# Patient Record
Sex: FEMALE | Race: ASIAN | NOT HISPANIC OR LATINO | Employment: OTHER | URBAN - METROPOLITAN AREA
[De-identification: names, ages, dates, MRNs, and addresses within clinical notes are randomized per-mention and may not be internally consistent; named-entity substitution may affect disease eponyms.]

---

## 2022-07-14 ENCOUNTER — TELEPHONE (OUTPATIENT)
Dept: FAMILY MEDICINE | Facility: CLINIC | Age: 47
End: 2022-07-14
Payer: OTHER GOVERNMENT

## 2022-07-18 ENCOUNTER — TELEPHONE (OUTPATIENT)
Dept: FAMILY MEDICINE | Facility: CLINIC | Age: 47
End: 2022-07-18
Payer: OTHER GOVERNMENT

## 2022-07-18 NOTE — TELEPHONE ENCOUNTER
Patient contacted and scheduled appointment for new patient. Patient confirmed appointment date and time./

## 2022-07-18 NOTE — TELEPHONE ENCOUNTER
----- Message from Monica Tan sent at 7/18/2022  4:28 PM CDT -----  Contact: Self  Pt called looking to set up new pt appt. 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     Phone: [phone]

## 2022-07-26 ENCOUNTER — OFFICE VISIT (OUTPATIENT)
Dept: FAMILY MEDICINE | Facility: CLINIC | Age: 47
End: 2022-07-26
Payer: OTHER GOVERNMENT

## 2022-07-26 ENCOUNTER — LAB VISIT (OUTPATIENT)
Dept: LAB | Facility: HOSPITAL | Age: 47
End: 2022-07-26
Attending: FAMILY MEDICINE
Payer: OTHER GOVERNMENT

## 2022-07-26 VITALS
OXYGEN SATURATION: 99 % | BODY MASS INDEX: 29.59 KG/M2 | DIASTOLIC BLOOD PRESSURE: 78 MMHG | SYSTOLIC BLOOD PRESSURE: 110 MMHG | HEIGHT: 62 IN | RESPIRATION RATE: 19 BRPM | HEART RATE: 53 BPM | WEIGHT: 160.81 LBS

## 2022-07-26 DIAGNOSIS — Z12.31 VISIT FOR SCREENING MAMMOGRAM: ICD-10-CM

## 2022-07-26 DIAGNOSIS — Z02.1 PHYSICAL EXAM, PRE-EMPLOYMENT: Primary | ICD-10-CM

## 2022-07-26 DIAGNOSIS — Z02.1 PHYSICAL EXAM, PRE-EMPLOYMENT: ICD-10-CM

## 2022-07-26 DIAGNOSIS — Z12.11 COLON CANCER SCREENING: ICD-10-CM

## 2022-07-26 LAB
ALBUMIN SERPL BCP-MCNC: 3.8 G/DL (ref 3.5–5.2)
ALP SERPL-CCNC: 61 U/L (ref 55–135)
ALT SERPL W/O P-5'-P-CCNC: 18 U/L (ref 10–44)
ANION GAP SERPL CALC-SCNC: 9 MMOL/L (ref 8–16)
AST SERPL-CCNC: 16 U/L (ref 10–40)
BASOPHILS # BLD AUTO: 0.02 K/UL (ref 0–0.2)
BASOPHILS NFR BLD: 0.3 % (ref 0–1.9)
BILIRUB SERPL-MCNC: 0.3 MG/DL (ref 0.1–1)
BUN SERPL-MCNC: 17 MG/DL (ref 6–20)
CALCIUM SERPL-MCNC: 9.1 MG/DL (ref 8.7–10.5)
CHLORIDE SERPL-SCNC: 109 MMOL/L (ref 95–110)
CHOLEST SERPL-MCNC: 216 MG/DL (ref 120–199)
CHOLEST/HDLC SERPL: 3.3 {RATIO} (ref 2–5)
CO2 SERPL-SCNC: 24 MMOL/L (ref 23–29)
CREAT SERPL-MCNC: 0.8 MG/DL (ref 0.5–1.4)
DIFFERENTIAL METHOD: ABNORMAL
EOSINOPHIL # BLD AUTO: 0.1 K/UL (ref 0–0.5)
EOSINOPHIL NFR BLD: 2.1 % (ref 0–8)
ERYTHROCYTE [DISTWIDTH] IN BLOOD BY AUTOMATED COUNT: 12.6 % (ref 11.5–14.5)
EST. GFR  (AFRICAN AMERICAN): >60 ML/MIN/1.73 M^2
EST. GFR  (NON AFRICAN AMERICAN): >60 ML/MIN/1.73 M^2
GLUCOSE SERPL-MCNC: 99 MG/DL (ref 70–110)
HCT VFR BLD AUTO: 39 % (ref 37–48.5)
HDLC SERPL-MCNC: 65 MG/DL (ref 40–75)
HDLC SERPL: 30.1 % (ref 20–50)
HGB BLD-MCNC: 13 G/DL (ref 12–16)
IMM GRANULOCYTES # BLD AUTO: 0.01 K/UL (ref 0–0.04)
IMM GRANULOCYTES NFR BLD AUTO: 0.2 % (ref 0–0.5)
LDH SERPL L TO P-CCNC: 170 U/L (ref 110–260)
LDLC SERPL CALC-MCNC: 128.2 MG/DL (ref 63–159)
LYMPHOCYTES # BLD AUTO: 3 K/UL (ref 1–4.8)
LYMPHOCYTES NFR BLD: 48.1 % (ref 18–48)
MCH RBC QN AUTO: 28.4 PG (ref 27–31)
MCHC RBC AUTO-ENTMCNC: 33.3 G/DL (ref 32–36)
MCV RBC AUTO: 85 FL (ref 82–98)
MONOCYTES # BLD AUTO: 0.4 K/UL (ref 0.3–1)
MONOCYTES NFR BLD: 5.9 % (ref 4–15)
NEUTROPHILS # BLD AUTO: 2.7 K/UL (ref 1.8–7.7)
NEUTROPHILS NFR BLD: 43.4 % (ref 38–73)
NONHDLC SERPL-MCNC: 151 MG/DL
NRBC BLD-RTO: 0 /100 WBC
PLATELET # BLD AUTO: 253 K/UL (ref 150–450)
PMV BLD AUTO: 10.3 FL (ref 9.2–12.9)
POTASSIUM SERPL-SCNC: 4 MMOL/L (ref 3.5–5.1)
PROT SERPL-MCNC: 7.1 G/DL (ref 6–8.4)
RBC # BLD AUTO: 4.57 M/UL (ref 4–5.4)
SODIUM SERPL-SCNC: 142 MMOL/L (ref 136–145)
TRIGL SERPL-MCNC: 114 MG/DL (ref 30–150)
WBC # BLD AUTO: 6.28 K/UL (ref 3.9–12.7)

## 2022-07-26 PROCEDURE — 99999 PR PBB SHADOW E&M-EST. PATIENT-LVL III: CPT | Mod: PBBFAC,,, | Performed by: FAMILY MEDICINE

## 2022-07-26 PROCEDURE — 99213 OFFICE O/P EST LOW 20 MIN: CPT | Mod: PBBFAC | Performed by: FAMILY MEDICINE

## 2022-07-26 PROCEDURE — 99999 PR PBB SHADOW E&M-EST. PATIENT-LVL III: ICD-10-PCS | Mod: PBBFAC,,, | Performed by: FAMILY MEDICINE

## 2022-07-26 PROCEDURE — 80053 COMPREHEN METABOLIC PANEL: CPT | Performed by: FAMILY MEDICINE

## 2022-07-26 PROCEDURE — 87389 HIV-1 AG W/HIV-1&-2 AB AG IA: CPT | Performed by: FAMILY MEDICINE

## 2022-07-26 PROCEDURE — 80061 LIPID PANEL: CPT | Performed by: FAMILY MEDICINE

## 2022-07-26 PROCEDURE — 36415 COLL VENOUS BLD VENIPUNCTURE: CPT | Performed by: FAMILY MEDICINE

## 2022-07-26 PROCEDURE — 99203 PR OFFICE/OUTPT VISIT, NEW, LEVL III, 30-44 MIN: ICD-10-PCS | Mod: S$PBB,,, | Performed by: FAMILY MEDICINE

## 2022-07-26 PROCEDURE — 99203 OFFICE O/P NEW LOW 30 MIN: CPT | Mod: S$PBB,,, | Performed by: FAMILY MEDICINE

## 2022-07-26 PROCEDURE — 85025 COMPLETE CBC W/AUTO DIFF WBC: CPT | Performed by: FAMILY MEDICINE

## 2022-07-26 PROCEDURE — 83615 LACTATE (LD) (LDH) ENZYME: CPT | Performed by: FAMILY MEDICINE

## 2022-07-26 NOTE — PROGRESS NOTES
Subjective:       Patient ID: Debora Vidal is a 47 y.o. female.    Chief Complaint: Establish Care    HPI  47 year old female comes in to establish care. She has moved here recently and needs a referral for counseling. She also notes that she needs a 'well woman exam.' She says she had a mammo and pap smear about 3 years ago. She has had a colonoscopy recently with a polyp removed. She was told she needs to f/u in 5 years.    PMH, PSH, ALLERGIES, SH, FH reviewed in nurse's notes above  Medications reconciled in the nurse's notes      Review of Systems   Constitutional: Negative for chills and fever.   HENT: Negative for congestion, ear pain, postnasal drip, rhinorrhea, sore throat and trouble swallowing.    Eyes: Negative for redness and itching.   Respiratory: Negative for cough, shortness of breath and wheezing.    Cardiovascular: Negative for chest pain and palpitations.   Gastrointestinal: Negative for abdominal pain, diarrhea, nausea and vomiting.   Genitourinary: Negative for dysuria and frequency.   Skin: Negative for rash.   Neurological: Negative for weakness and headaches.   Psychiatric/Behavioral: Negative for dysphoric mood.       Objective:      Physical Exam  Vitals and nursing note reviewed.   Constitutional:       General: She is not in acute distress.     Appearance: She is well-developed.   HENT:      Head: Normocephalic and atraumatic.   Eyes:      Conjunctiva/sclera: Conjunctivae normal.      Pupils: Pupils are equal, round, and reactive to light.   Neck:      Thyroid: No thyromegaly.   Cardiovascular:      Rate and Rhythm: Normal rate and regular rhythm.      Heart sounds: Normal heart sounds.   Pulmonary:      Effort: Pulmonary effort is normal. No respiratory distress.      Breath sounds: Normal breath sounds. No wheezing.   Abdominal:      General: Bowel sounds are normal.      Palpations: Abdomen is soft.      Tenderness: There is no abdominal tenderness.   Musculoskeletal:         General:  Normal range of motion.      Cervical back: Normal range of motion and neck supple.   Lymphadenopathy:      Cervical: No cervical adenopathy.   Skin:     General: Skin is warm and dry.      Findings: No rash.   Neurological:      Mental Status: She is alert and oriented to person, place, and time.   Psychiatric:         Behavior: Behavior normal.          Assessment/Plan:       Problem List Items Addressed This Visit    None     Visit Diagnoses     Physical exam, pre-employment    -  Primary    Relevant Orders    Lipid Panel (Completed)    Comprehensive Metabolic Panel (Completed)    CBC Auto Differential (Completed)    Lactate Dehydrogenase (Completed)    HIV 1/2 Ag/Ab (4th Gen) (Completed)    Visit for screening mammogram        Relevant Orders    Mammo Digital Screening Bilat w/ [facility]    Colon cancer screening          had colonoscopy. Will get path.    Need to obtain recent pap/path.    RTC if condition acutely worsens or any other concerns, otherwise RTC as scheduled

## 2022-07-28 LAB — HIV 1+2 AB+HIV1 P24 AG SERPL QL IA: NEGATIVE

## 2022-08-09 ENCOUNTER — PATIENT OUTREACH (OUTPATIENT)
Dept: ADMINISTRATIVE | Facility: HOSPITAL | Age: 47
End: 2022-08-09
Payer: OTHER GOVERNMENT

## 2022-08-09 NOTE — PROGRESS NOTES
Chart reviewed, no Links immunization record noted.  No new results noted on [facility] or [facility] web site.  Care Everywhere updated.   Patient care coordination note updated.  LOV with PCP 7/26/2022.  Patient has scheduled MMG for 8/15/2022.  (Had colonoscopy in [place] - 2 polyps needs repeat in 5 years.    )    Left detailed message for patient to return call to discuss specific provider or facility testing completed at in [place].

## 2022-08-15 ENCOUNTER — HOSPITAL ENCOUNTER (OUTPATIENT)
Dept: RADIOLOGY | Facility: HOSPITAL | Age: 47
Discharge: HOME | End: 2022-08-15
Attending: FAMILY MEDICINE
Payer: OTHER GOVERNMENT

## 2022-08-15 VITALS — WEIGHT: 150 LBS | BODY MASS INDEX: 27.6 KG/M2 | HEIGHT: 62 IN

## 2022-08-15 DIAGNOSIS — Z12.31 VISIT FOR SCREENING MAMMOGRAM: ICD-10-CM

## 2022-08-15 PROCEDURE — 77063 BREAST TOMOSYNTHESIS BI: CPT | Mod: TC

## 2022-08-15 PROCEDURE — 77067 SCR MAMMO BI INCL CAD: CPT | Mod: TC

## 2022-09-29 ENCOUNTER — OFFICE VISIT (OUTPATIENT)
Dept: FAMILY MEDICINE | Facility: CLINIC | Age: 47
End: 2022-09-29
Payer: OTHER GOVERNMENT

## 2022-09-29 VITALS
WEIGHT: 160.19 LBS | HEART RATE: 58 BPM | HEIGHT: 62 IN | RESPIRATION RATE: 16 BRPM | SYSTOLIC BLOOD PRESSURE: 108 MMHG | DIASTOLIC BLOOD PRESSURE: 62 MMHG | BODY MASS INDEX: 29.48 KG/M2

## 2022-09-29 DIAGNOSIS — M79.604 PAIN OF RIGHT LOWER EXTREMITY: ICD-10-CM

## 2022-09-29 DIAGNOSIS — M79.671 RIGHT FOOT PAIN: Primary | ICD-10-CM

## 2022-09-29 DIAGNOSIS — M77.9 TENDINITIS: ICD-10-CM

## 2022-09-29 DIAGNOSIS — Z98.890 S/P BUNIONECTOMY: ICD-10-CM

## 2022-09-29 PROCEDURE — 99213 OFFICE O/P EST LOW 20 MIN: CPT | Mod: PBBFAC | Performed by: FAMILY MEDICINE

## 2022-09-29 PROCEDURE — 99999 PR PBB SHADOW E&M-EST. PATIENT-LVL III: CPT | Mod: PBBFAC,,, | Performed by: FAMILY MEDICINE

## 2022-09-29 PROCEDURE — 99213 PR OFFICE/OUTPT VISIT, EST, LEVL III, 20-29 MIN: ICD-10-PCS | Mod: S$PBB,,, | Performed by: FAMILY MEDICINE

## 2022-09-29 PROCEDURE — 99999 PR PBB SHADOW E&M-EST. PATIENT-LVL III: ICD-10-PCS | Mod: PBBFAC,,, | Performed by: FAMILY MEDICINE

## 2022-09-29 PROCEDURE — 99213 OFFICE O/P EST LOW 20 MIN: CPT | Mod: S$PBB,,, | Performed by: FAMILY MEDICINE

## 2022-09-29 NOTE — PROGRESS NOTES
Subjective:       Patient ID: Debora Vidal is a 47 y.o. female.    Chief Complaint: Referral    HPI  47 year old female comes in for right foot PT. She had a bunionectomy in march and is looking to continue physical therapy.     PMH, PSH, ALLERGIES, SH, FH reviewed in nurse's notes above  Medications reconciled in the nurse's notes    Review of Systems   Constitutional:  Negative for activity change and unexpected weight change.   HENT:  Negative for hearing loss, rhinorrhea and trouble swallowing.    Eyes:  Negative for discharge and visual disturbance.   Respiratory:  Negative for chest tightness and wheezing.    Cardiovascular:  Negative for chest pain and palpitations.   Gastrointestinal:  Negative for blood in stool, constipation, diarrhea and vomiting.   Endocrine: Negative for polydipsia and polyuria.   Genitourinary:  Negative for difficulty urinating, dysuria, hematuria and menstrual problem.   Musculoskeletal:  Negative for arthralgias, joint swelling and neck pain.   Neurological:  Negative for weakness and headaches.   Psychiatric/Behavioral:  Negative for confusion and dysphoric mood.      Objective:      Physical Exam  Vitals and nursing note reviewed.   Constitutional:       General: She is not in acute distress.     Appearance: She is well-developed.   HENT:      Head: Normocephalic and atraumatic.   Eyes:      Conjunctiva/sclera: Conjunctivae normal.      Pupils: Pupils are equal, round, and reactive to light.   Neck:      Thyroid: No thyromegaly.   Cardiovascular:      Rate and Rhythm: Normal rate and regular rhythm.      Heart sounds: Normal heart sounds.   Pulmonary:      Effort: Pulmonary effort is normal. No respiratory distress.      Breath sounds: Normal breath sounds. No wheezing.   Abdominal:      General: Bowel sounds are normal.      Palpations: Abdomen is soft.      Tenderness: There is no abdominal tenderness.   Musculoskeletal:         General: Normal range of motion.      Cervical  back: Normal range of motion and neck supple.   Lymphadenopathy:      Cervical: No cervical adenopathy.   Skin:     General: Skin is warm and dry.      Findings: No rash.   Neurological:      Mental Status: She is alert and oriented to person, place, and time.   Psychiatric:         Behavior: Behavior normal.        Assessment/Plan:       Problem List Items Addressed This Visit    None  Visit Diagnoses       Right foot pain    -  Primary    Relevant Orders    Ambulatory referral/consult to Physical/Occupational Therapy    S/P bunionectomy        Pain of right lower extremity        Tendinitis        Relevant Orders    Ambulatory referral/consult to Physical/Occupational Therapy        RTC if condition acutely worsens or any other concerns, otherwise RTC as scheduled

## 2022-10-03 ENCOUNTER — PATIENT MESSAGE (OUTPATIENT)
Dept: ADMINISTRATIVE | Facility: HOSPITAL | Age: 47
End: 2022-10-03
Payer: OTHER GOVERNMENT

## 2022-10-20 ENCOUNTER — TELEPHONE (OUTPATIENT)
Dept: FAMILY MEDICINE | Facility: CLINIC | Age: 47
End: 2022-10-20
Payer: OTHER GOVERNMENT

## 2022-10-20 NOTE — TELEPHONE ENCOUNTER
----- Message from Monica Tan sent at 10/20/2022 10:53 AM CDT -----  Contact: self  Debora Vidal  MRN: [id number]  : 1975  PCP: Ashley Pelaez  Home Phone      [phone]  Work Phone      Not on file.  Mobile          [phone]      MESSAGE:  Pt called stating her referral for PT has  and is needing an extension to 16 appts. Referral was for 4 office visits. Needs to be extended.    Phone:  [phone]

## 2022-10-20 NOTE — TELEPHONE ENCOUNTER
Spoke with patient and informed that  referral has been placed for patient to receive more PT visit. Status is pending, but patient was informed.

## 2022-10-29 ENCOUNTER — PATIENT MESSAGE (OUTPATIENT)
Dept: ADMINISTRATIVE | Facility: HOSPITAL | Age: 47
End: 2022-10-29
Payer: OTHER GOVERNMENT

## 2022-11-01 ENCOUNTER — PATIENT OUTREACH (OUTPATIENT)
Dept: ADMINISTRATIVE | Facility: HOSPITAL | Age: 47
End: 2022-11-01
Payer: OTHER GOVERNMENT

## 2022-11-01 NOTE — PROGRESS NOTES
Chart reviewed, no Links immunization record noted.  No new results noted on [facility] or [facility] web site.  Care Everywhere updated.   Patient care coordination note updated.  LOV with PCP 09/29/2022.  Uploaded Colonoscopy completed on 4/28/2022 from [facility], updated in .  Patient requested to have pap smear scheduled through patient portal.   Scheduled appointment for patient to see Molly Hyman MD for 12/07/2022 at 12:00pm.  Patient notified through patient portal.

## 2023-01-11 ENCOUNTER — PATIENT MESSAGE (OUTPATIENT)
Dept: FAMILY MEDICINE | Facility: CLINIC | Age: 48
End: 2023-01-11
Payer: OTHER GOVERNMENT

## 2023-01-11 DIAGNOSIS — M79.671 RIGHT FOOT PAIN: ICD-10-CM

## 2023-01-11 DIAGNOSIS — M77.9 TENDINITIS: Primary | ICD-10-CM

## 2023-01-12 ENCOUNTER — TELEPHONE (OUTPATIENT)
Dept: FAMILY MEDICINE | Facility: CLINIC | Age: 48
End: 2023-01-12
Payer: OTHER GOVERNMENT

## 2023-01-12 ENCOUNTER — PATIENT MESSAGE (OUTPATIENT)
Dept: FAMILY MEDICINE | Facility: CLINIC | Age: 48
End: 2023-01-12
Payer: OTHER GOVERNMENT

## 2023-01-12 DIAGNOSIS — H91.90 DECREASED HEARING, UNSPECIFIED LATERALITY: Primary | ICD-10-CM

## 2023-01-12 NOTE — TELEPHONE ENCOUNTER
Patient states that she had recent audiogram test with [facility] and it showed  some level of declines with ranges.    Can referral to audiology be placed.   Referral pending

## 2023-01-31 ENCOUNTER — LAB VISIT (OUTPATIENT)
Dept: LAB | Facility: HOSPITAL | Age: 48
End: 2023-01-31
Attending: OBSTETRICS & GYNECOLOGY
Payer: OTHER GOVERNMENT

## 2023-01-31 ENCOUNTER — OFFICE VISIT (OUTPATIENT)
Dept: OBSTETRICS AND GYNECOLOGY | Facility: CLINIC | Age: 48
End: 2023-01-31
Payer: OTHER GOVERNMENT

## 2023-01-31 VITALS
BODY MASS INDEX: 31.17 KG/M2 | RESPIRATION RATE: 18 BRPM | OXYGEN SATURATION: 97 % | WEIGHT: 169.38 LBS | DIASTOLIC BLOOD PRESSURE: 82 MMHG | HEIGHT: 62 IN | SYSTOLIC BLOOD PRESSURE: 124 MMHG | HEART RATE: 63 BPM

## 2023-01-31 DIAGNOSIS — Z12.4 CERVICAL CANCER SCREENING: ICD-10-CM

## 2023-01-31 DIAGNOSIS — N91.2 ABSENT MENSES: ICD-10-CM

## 2023-01-31 DIAGNOSIS — Z01.419 WELL WOMAN EXAM WITH ROUTINE GYNECOLOGICAL EXAM: Primary | ICD-10-CM

## 2023-01-31 DIAGNOSIS — Z12.31 BREAST CANCER SCREENING BY MAMMOGRAM: ICD-10-CM

## 2023-01-31 PROCEDURE — 99386 PREV VISIT NEW AGE 40-64: CPT | Mod: S$PBB,,, | Performed by: OBSTETRICS & GYNECOLOGY

## 2023-01-31 PROCEDURE — 88175 CYTOPATH C/V AUTO FLUID REDO: CPT | Performed by: OBSTETRICS & GYNECOLOGY

## 2023-01-31 PROCEDURE — 99999 PR PBB SHADOW E&M-EST. PATIENT-LVL III: CPT | Mod: PBBFAC,,, | Performed by: OBSTETRICS & GYNECOLOGY

## 2023-01-31 PROCEDURE — 83001 ASSAY OF GONADOTROPIN (FSH): CPT | Performed by: OBSTETRICS & GYNECOLOGY

## 2023-01-31 PROCEDURE — 82670 ASSAY OF TOTAL ESTRADIOL: CPT | Performed by: OBSTETRICS & GYNECOLOGY

## 2023-01-31 PROCEDURE — 99213 OFFICE O/P EST LOW 20 MIN: CPT | Mod: PBBFAC | Performed by: OBSTETRICS & GYNECOLOGY

## 2023-01-31 PROCEDURE — 87624 HPV HI-RISK TYP POOLED RSLT: CPT | Performed by: OBSTETRICS & GYNECOLOGY

## 2023-01-31 PROCEDURE — 99999 PR PBB SHADOW E&M-EST. PATIENT-LVL III: ICD-10-PCS | Mod: PBBFAC,,, | Performed by: OBSTETRICS & GYNECOLOGY

## 2023-01-31 PROCEDURE — 99386 PR PREVENTIVE VISIT,NEW,40-64: ICD-10-PCS | Mod: S$PBB,,, | Performed by: OBSTETRICS & GYNECOLOGY

## 2023-01-31 PROCEDURE — 36415 COLL VENOUS BLD VENIPUNCTURE: CPT | Performed by: OBSTETRICS & GYNECOLOGY

## 2023-01-31 NOTE — PROGRESS NOTES
Subjective:    Patient ID: Debora Vidal is a 47 y.o. y.o. female.     Chief Complaint: Annual Well Woman Exam     History of Present Illness:  Debora presents today for Annual Well Woman exam. She describes her menses as  absent .She denies pelvic pain.  She denies breast tenderness, masses, nipple discharge. She denies difficulty with urination or bowel movements. She denies menopausal symptoms such as hotflashes, vaginal dryness, and night sweats. She denies bloating, early satiety, or weight changes. She is sexually active. Contraception is by no method.    The following portions of the patient's history were reviewed and updated as appropriate: allergies, current medications, past family history, past medical history, past social history, past surgical history and problem list.      Menstrual History:   No LMP recorded. (Menstrual status: Other)..     OB History          3    Para   3    Term   3            AB        Living             SAB        IAB        Ectopic        Multiple        Live Births                     The following portions of the patient's history were reviewed and updated as appropriate: allergies, current medications, past family history, past medical history, past social history, past surgical history, and problem list.        ROS:     Review of Systems   Constitutional:  Negative for activity change, appetite change, chills, diaphoresis, fatigue, fever and unexpected weight change.   HENT:  Negative for mouth sores and tinnitus.    Eyes:  Negative for discharge and visual disturbance.   Respiratory:  Negative for cough, shortness of breath and wheezing.    Cardiovascular:  Negative for chest pain, palpitations and leg swelling.   Gastrointestinal:  Negative for abdominal pain, bloating, blood in stool, constipation, diarrhea, nausea and vomiting.   Endocrine: Negative for diabetes, hair loss, hot flashes, hyperthyroidism and hypothyroidism.   Genitourinary:  Negative for  "dysuria, flank pain, frequency, genital sores, hematuria, urgency, vaginal bleeding, vaginal discharge, vaginal pain, postcoital bleeding and vaginal odor.   Musculoskeletal:  Negative for back pain, joint swelling and myalgias.   Integumentary:  Negative for rash, acne, breast mass, nipple discharge and breast skin changes.   Neurological:  Negative for seizures, syncope, numbness and headaches.   Hematological:  Negative for adenopathy. Does not bruise/bleed easily.   Psychiatric/Behavioral:  Negative for depression and sleep disturbance. The patient is not nervous/anxious.    Breast: Negative for mass, mastodynia, nipple discharge and skin changes    Objective:    Vital Signs:  Vitals:    01/31/23 0959   BP: 124/82   Pulse: 63   Resp: 18   SpO2: 97%   Weight: 76.8 kg (169 lb 6.4 oz)   Height: 5' 2" (1.575 m)         Physical Exam:  General:  alert, cooperative, appears stated age   Skin:  Skin color, texture, turgor normal. No rashes or lesions   HEENT:  conjunctivae/corneas clear. PERRL.   Neck: supple, trachea midline, no adenopathy or thyromegally   Respiratory:  clear to auscultation bilaterally   Heart:  regular rate and rhythm, S1, S2 normal, no murmur, click, rub or gallop   Breasts:  no discharge, erythema, or tenderness   Abdomen:  normal findings: bowel sounds normal, no masses palpable, no organomegaly and soft, non-tender   Pelvis: External genitalia: normal general appearance  Urinary system: urethral meatus normal, bladder nontender  Vaginal: normal mucosa without prolapse or lesions  Cervix: normal appearance  Uterus: normal size, shape, position  Adnexa: normal size, nontender bilaterally   Extremities: Normal ROM; no edema, no cyanosis   Neurologial: Normal strength and tone. No focal numbness or weakness. Reflexes 2+ and equal.   Psychiatric: normal mood, speech, dress, and thought processes         Assessment:       Healthy female exam.     1. Well woman exam with routine gynecological exam  "   2. Cervical cancer screening    3. Breast cancer screening by mammogram    4. Absent menses          Plan:       Thin prep Pap smear.  HPV cotesting   Colonoscopy done 4/2022; repeat in 5 years  MMG UTD  Labs  RTC In 1 year or prn    COUNSELING:  Debora was counseled on STD pevention, use and side-effects of various contraceptive measures, A.C.O.G. Pap guidelines and recommendations for yearly pelvic exams in addition to recommendations for monthly self breast exams; to see her PCP for other health maintenance.

## 2023-02-01 ENCOUNTER — PATIENT MESSAGE (OUTPATIENT)
Dept: FAMILY MEDICINE | Facility: CLINIC | Age: 48
End: 2023-02-01
Payer: OTHER GOVERNMENT

## 2023-02-01 LAB
ESTRADIOL SERPL-MCNC: <10 PG/ML
FSH SERPL-ACNC: 54.53 MIU/ML

## 2023-02-03 ENCOUNTER — TELEPHONE (OUTPATIENT)
Dept: FAMILY MEDICINE | Facility: CLINIC | Age: 48
End: 2023-02-03
Payer: OTHER GOVERNMENT

## 2023-02-03 DIAGNOSIS — S46.812S STRAIN OF LEFT TRAPEZIUS MUSCLE, SEQUELA: Primary | ICD-10-CM

## 2023-02-03 NOTE — TELEPHONE ENCOUNTER
----- Message from Monica Tan sent at 2023  3:17 PM CST -----  Contact: self  Debora Vidal  MRN: [id number]  : 1975  PCP: Ashley Pelaez  Home Phone      [phone]  Work Phone      Not on file.  Mobile          [phone]      MESSAGE:   Pt called asking if there was any way provider can order some additional PT sessions at [facility] in [place] for patient.     Phone:  [phone]

## 2023-02-03 NOTE — TELEPHONE ENCOUNTER
Pt spoke with SHAHEED Ramirez [place] [facility] [place]. Pt states she needs 15 session for only PT for R foot per . Okay to submit threw ?

## 2023-02-07 LAB
FINAL PATHOLOGIC DIAGNOSIS: NORMAL
Lab: NORMAL

## 2023-02-09 LAB
HPV HR 12 DNA SPEC QL NAA+PROBE: NEGATIVE
HPV16 AG SPEC QL: NEGATIVE
HPV18 DNA SPEC QL NAA+PROBE: NEGATIVE

## 2023-02-24 ENCOUNTER — CLINICAL SUPPORT (OUTPATIENT)
Dept: REHABILITATION | Facility: HOSPITAL | Age: 48
End: 2023-02-24
Attending: FAMILY MEDICINE
Payer: OTHER GOVERNMENT

## 2023-02-24 DIAGNOSIS — S46.812S STRAIN OF LEFT TRAPEZIUS MUSCLE, SEQUELA: ICD-10-CM

## 2023-02-24 NOTE — PROGRESS NOTES
"  [facility]   Physical Therapy Initial Evaluation     Date: 2023   Name: Debora Vidal  Clinic Number: [id number]    Therapy Diagnosis: No diagnosis found.  Physician: Ashley Pelaez MD    Physician Orders: PT Eval and Treat   Medical Diagnosis from Referral: S46.812S (ICD-10-CM) - Strain of left trapezius muscle, sequela   Evaluation Date: 2023  Authorization Period Expiration: 2024  Plan of Care Expiration: ***  Progress Note Due: ***  Visit # / Visits authorized: 1/ pending   FOTO: 1/TBD    Precautions: Standard     Time In: ***  Time Out: ***  Total Appointment Time (timed & untimed codes): *** minutes      SUBJECTIVE     Date of onset: ***    History of current condition - Lawn/David presents to physical therapy evaluation with complaints of ***  that started ***. Patient stated *** pain with intermittent *** primarily  with functional activities such as ***.  Patient reports a history of ***.  Patient states their goals are to decrease pain and improve mobility to get back to *** without discomfort.    Falls: ***    Imaging:  *** done on ***. See imaging for details.        Prior Therapy: ***  Social History: Lives with ***  Occupation: ***  Prior Level of Function: ***  Current Level of Function: ***    Pain:  Current {0-10:04116::"0"}/10, worst {0-10:67351::"0"}/10, best {0-10:40796::"0"}/10   Location:  left shoulder(s)  Description: {Pain Description:45964}  Aggravating Factors: {Causes; Pain:49987}  Easing Factors: {Pain (activities that relieve):50741}    Patients goals: ***     Medical History:   Past Medical History:   Diagnosis Date    Gestational diabetes     Last Pregnancy        Surgical History:   Debora Vidal  has a past surgical history that includes Bunionectomy (Right, 2022); Knee arthroscopy (Right, );  section; and  section with tubal ligation ().    Medications:   Debora has a current medication list which includes the " following prescription(s): ibuprofen.    Allergies:   Review of patient's allergies indicates:   Allergen Reactions    Grass pollen-layton grass standard Hives, Shortness Of Breath and Swelling    Insect venom Swelling     ANTS    Cat/feline products Hives          OBJECTIVE     Observation: ***    Posture: ***    Cervical Range of Motion:    Degrees(%) Pain   Flexion *** ***     Extension *** ***     Right Rotation *** ***     Left Rotation *** ***     Right Side Bending *** ***   Left Side Bending *** ***      Passive Range of Motion:   Shoulder Right Left   Flexion *** ***   Abduction *** ***   ER at 0 *** ***   ER at 90 *** ***   IR *** ***      Active Range of Motion:   Shoulder Right Left   Flexion *** ***   Abduction *** ***   ER at 0 *** ***   ER at 90 *** ***   IR *** ***     Strength:  Shoulder Right Left   Flexion {AMB PT VESTIBULAR STRENGTH:05283} {AMB PT VESTIBULAR STRENGTH:01040}   Abduction {AMB PT VESTIBULAR STRENGTH:23619} {AMB PT VESTIBULAR STRENGTH:25043}   ER {AMB PT VESTIBULAR STRENGTH:92006} {AMB PT VESTIBULAR STRENGTH:40858}   IR {AMB PT VESTIBULAR STRENGTH:52076} {AMB PT VESTIBULAR STRENGTH:33884}   Serratus Anterior {AMB PT VESTIBULAR STRENGTH:14912} {AMB PT VESTIBULAR STRENGTH:54115}   Middle Trap {AMB PT VESTIBULAR STRENGTH:74870} {AMB PT VESTIBULAR STRENGTH:05891}   Low Trap {AMB PT VESTIBULAR STRENGTH:53180} {AMB PT VESTIBULAR STRENGTH:19837}       Special Tests:  Distraction ***   Spurlings ***       Special Tests:   Right Left   Empty Can Test *** ***   Drop Arm test *** ***   Subscaputlaris Lift Off *** ***   O'madi's test *** ***   Stevankin's Kenndy *** ***   Neer's Test *** ***   Anterior Apprehension test *** ***   Sulcus Sign *** ***   Bicep Load *** ***   Crank test *** ***     Cervical joint mobility: ***      Thoracic mobility: ***    Shoulder Joint Mobility: ***    Palpation: ***    Sensation: ***    Flexibility: ***  Lat: R *** ; L ***   Pec Minor: R *** ; L ***        Limitation/Restriction for FOTO *** Survey    Therapist reviewed FOTO scores for Debora Vidal on 2/24/2023.   FOTO documents entered into [employer] - see Media section.    Limitation Score: ***%         TREATMENT     Total Treatment time (time-based codes) separate from Evaluation: 15 minutes      Shahrzad/David received the treatments listed below:      therapeutic exercises to develop strength, endurance, ROM, flexibility, and posture for 15 minutes including:  ***    manual therapy techniques: Joint mobilizations, Manual traction, Myofacial release, and Soft tissue Mobilization were applied to the: left trapezius for 0 minutes, including:  Not today    neuromuscular re-education activities to improve: Sense, Proprioception, and Posture for 0 minutes. The following activities were included:  Not today    therapeutic activities to improve functional performance for 0  minutes, including:  Not today      supervised modalities after being cleared for contradictions: IFC Electrical Stimulation:  Debora received IFC Electrical Stimulation for pain control applied to the left trapezius. Pt received stimulation at 0 intensity at a frequency of  for 0 minutes. Debora tolderated treatment well without any adverse effects.        PATIENT EDUCATION AND HOME EXERCISES     Education provided:   -Educated patient plan of care and home exercise program.  -Educated patient possible soreness or discomforts following initial evaluation due to pain provoking objective measures.  - ***    Written Home Exercises Provided: yes. Exercises were reviewed and Latonia was able to demonstrate them prior to the end of the session.  Latonia demonstrated good  understanding of the education provided. See EMR under Patient Instructions for exercises provided during therapy sessions.    ASSESSMENT     Debora is a 47 y.o. female referred to outpatient Physical Therapy with a medical diagnosis of Strain of left trapezius muscle, sequela . Patient  presents with limited ***, and increased pain with functional activities such as ***.  Patient presents with positive *** special test indicating possible ***.  Patient will benefit from skilled outpatient physical therapy services to address the above listed deficits to return to prior level of function.       Patient prognosis is Good.   Patient will benefit from skilled outpatient Physical Therapy to address the deficits stated above and in the chart below, provide patient /family education, and to maximize patientt's level of independence.     Plan of care discussed with patient: Yes  Patient's spiritual, cultural and educational needs considered and patient is agreeable to the plan of care and goals as stated below:     Anticipated Barriers for therapy: ***    Medical Necessity is demonstrated by the following  History  Co-morbidities and personal factors that may impact the plan of care Co-morbidities:   Gestational diabetes    Personal Factors:   no deficits     low   Examination  Body Structures and Functions, activity limitations and participation restrictions that may impact the plan of care Body Regions:   neck  upper extremities    Body Systems:    {Body Systems:58635}    Participation Restrictions:   ***    Activity limitations:   Learning and applying knowledge  {Learning and applying knowledge:37970}    General Tasks and Commands  {Gen tasks and commands:03273}    Communication  {Communication:26706}    Mobility  {Mobility:74160}    Self care  {Self Care:44232}    Domestic Life  {Domestic Life:70436}         {Desc; low/moderate/high:934159}   Clinical Presentation {Clinical Presentation :21364} {Desc; low/moderate/high:239763}   Decision Making/ Complexity Score: {Desc; low/moderate/high:652738}     GOALS: Short Term Goals:  *** weeks  1.Report decreased *** pain < / =  ***/10  to increase tolerance for ***  2. Increase PROM ***   3. Increased strength by 1/3 MMT grade in *** to increase tolerance for  "ADL and work activities.  4. Pt to tolerate HEP to improve ROM and independence with ADL's    Long Term Goals: *** weeks  1.Report decreased *** pain  < / =  *** /10  to increase tolerance for ***  2.Increase AROM to ***  3.Increase strength to >/= 4/5 in *** to increase tolerance for ADL and work activities.  4. Pt goal: ***  5. Pt will have improved gcode of CJ (20-40% limited) on FOTO shoulder in order to demonstrate true functional improvement.     PLAN   Plan of care Certification: 2/24/2023 to ***.    Outpatient Physical Therapy {NUMBERS 1-5:09958} times weekly for {0-10:39208::"0"} weeks to include the following interventions: {TX PLAN:21262}.     Pt may be seen by PTA as part of the rehabilitation team.     Malathi Perry, PT      I CERTIFY THE NEED FOR THESE SERVICES FURNISHED UNDER THIS PLAN OF TREATMENT AND WHILE UNDER MY CARE     Physician's Signature: ___________________________________________________   "

## 2023-02-24 NOTE — PROGRESS NOTES
Patient: Debora Brannon  MRN#: [id number]    Spoke with patient for clarity on which body part she is being referred for physical therapy treatment.  There are two referrals for physical therapy: one for left trapezius strain, and the other for right foot pain, tendonitis.  Patient states that she does not have any shoulder or neck issues, and she is currently being treated with [facility] physical therapy in [place] for her right foot pain.  She was confused as to why this appointment was made, but she come to the appointment thinking it was a second opinion.  She states that [facility] was having trouble getting new authorization for 15 visits for a while, and there were multiple professionals working on getting it approved.  The new authorization was finally approved this month and she getting treated for her foot under it.  I explained to her that I would not be able to perform an evaluation on her foot using this authorization if it is being used at another clinic.  She voiced understanding.  She said she has no issues with her shoulder, and therefore does not need to be evaluated for it.  I encouraged her to call her referring provider to clarify which clinic the referral was for.  She voiced understanding.    Patient was not evaluated today and this visit will not be charged.    Malathi Perry, PT, DPT

## 2023-02-24 NOTE — Clinical Note
Sending this encounter with patient to clarify if the right foot referral was to be evaluated at [facility]  in [place].  Because she is currently being treated at [facility] for her right foot under this referral, I would not be able to evaluate her.

## 2023-03-14 ENCOUNTER — TELEPHONE (OUTPATIENT)
Dept: FAMILY MEDICINE | Facility: CLINIC | Age: 48
End: 2023-03-14
Payer: OTHER GOVERNMENT

## 2023-03-14 NOTE — TELEPHONE ENCOUNTER
----- Message from Margaret Sahu sent at 3/13/2023  2:57 PM CDT -----  Contact: marisa/  Debora Vidal  MRN: [id number]  : 1975  PCP: Ashley Pelaez  Home Phone      [phone]  Work Phone      Not on file.  Mobile          [phone]      MESSAGE:   Patient was referred to [facility] in [place] but referral was addressed to wrong facility. It was an oversight that the center did not catch until billing brought it to their attention.     They are requesting a new referral be sent with the same dates if 23 to 23 because they did treat the patient on 2023    Please address new referral to [facility] on [address]     [phone](F)  [phone] (P)

## 2023-05-03 ENCOUNTER — OFFICE VISIT (OUTPATIENT)
Dept: FAMILY MEDICINE | Facility: CLINIC | Age: 48
End: 2023-05-03
Payer: OTHER GOVERNMENT

## 2023-05-03 VITALS
RESPIRATION RATE: 16 BRPM | WEIGHT: 165.44 LBS | BODY MASS INDEX: 30.44 KG/M2 | SYSTOLIC BLOOD PRESSURE: 106 MMHG | DIASTOLIC BLOOD PRESSURE: 82 MMHG | HEART RATE: 60 BPM | HEIGHT: 62 IN

## 2023-05-03 DIAGNOSIS — J06.9 UPPER RESPIRATORY TRACT INFECTION, UNSPECIFIED TYPE: Primary | ICD-10-CM

## 2023-05-03 PROCEDURE — 96372 THER/PROPH/DIAG INJ SC/IM: CPT | Mod: PBBFAC

## 2023-05-03 PROCEDURE — 99213 OFFICE O/P EST LOW 20 MIN: CPT | Mod: S$PBB,,, | Performed by: FAMILY MEDICINE

## 2023-05-03 PROCEDURE — 99213 OFFICE O/P EST LOW 20 MIN: CPT | Mod: PBBFAC,25 | Performed by: FAMILY MEDICINE

## 2023-05-03 PROCEDURE — 99999 PR PBB SHADOW E&M-EST. PATIENT-LVL III: ICD-10-PCS | Mod: PBBFAC,,, | Performed by: FAMILY MEDICINE

## 2023-05-03 PROCEDURE — 99213 PR OFFICE/OUTPT VISIT, EST, LEVL III, 20-29 MIN: ICD-10-PCS | Mod: S$PBB,,, | Performed by: FAMILY MEDICINE

## 2023-05-03 PROCEDURE — 99999 PR PBB SHADOW E&M-EST. PATIENT-LVL III: CPT | Mod: PBBFAC,,, | Performed by: FAMILY MEDICINE

## 2023-05-03 RX ORDER — METHYLPREDNISOLONE ACETATE 40 MG/ML
40 INJECTION, SUSPENSION INTRA-ARTICULAR; INTRALESIONAL; INTRAMUSCULAR; SOFT TISSUE
Status: COMPLETED | OUTPATIENT
Start: 2023-05-03 | End: 2023-05-03

## 2023-05-03 RX ORDER — AZELASTINE 1 MG/ML
1-2 SPRAY, METERED NASAL 2 TIMES DAILY
Qty: 30 ML | Refills: 5 | Status: SHIPPED | OUTPATIENT
Start: 2023-05-03 | End: 2024-02-08

## 2023-05-03 RX ORDER — DOXYCYCLINE HYCLATE 100 MG
100 TABLET ORAL 2 TIMES DAILY
Qty: 20 TABLET | Refills: 0 | Status: SHIPPED | OUTPATIENT
Start: 2023-05-03 | End: 2023-05-13

## 2023-05-03 RX ORDER — BENZONATATE 100 MG/1
100 CAPSULE ORAL 3 TIMES DAILY PRN
Qty: 30 CAPSULE | Refills: 0 | Status: SHIPPED | OUTPATIENT
Start: 2023-05-03 | End: 2023-05-13

## 2023-05-03 RX ADMIN — METHYLPREDNISOLONE ACETATE 40 MG: 40 INJECTION, SUSPENSION INTRA-ARTICULAR; INTRALESIONAL; INTRAMUSCULAR; SOFT TISSUE at 11:05

## 2023-05-03 NOTE — PROGRESS NOTES
Subjective:       Patient ID: Debora Vidal is a 47 y.o. female.    Chief Complaint: Cough (Patient states dry cough started friday), Sore Throat (Sore throat started friday), Nasal Congestion (Runny nose started sunday), Chest Pain (Chest pain is because of cough), Fever (Patient states she felt feverish this morning, negative for fever at this time), and Headache (Patient states she has severe headache on friday)    HPI  47 year odl female coems inw with c/o sore throat that started on Friday. She started with migraine headaches afterwards. She took ibuprofen. On Saturday she was exhausted and slept all day. On Sunday she started with rhinorrhea and cough. She does have nikita chest pain with her cough and feels tight. She does have a decrease in her sleep because of her coughing. She did take zyrtec and a sleep aid yesterday. Today she feels warm. She has had a neg covid test.    PMH, PSH, ALLERGIES, SH, FH reviewed in nurse's notes above  Medications reconciled in the nurse's notes    Review of Systems   Constitutional:  Negative for chills and fever.   HENT:  Positive for rhinorrhea and sore throat. Negative for ear pain and postnasal drip.    Respiratory:  Positive for cough, shortness of breath and wheezing.    Cardiovascular:  Positive for chest pain.   Musculoskeletal:  Negative for myalgias.   Skin:  Negative for rash.   Allergic/Immunologic: Positive for environmental allergies.   Neurological:  Positive for headaches.     Objective:      Physical Exam  Vitals and nursing note reviewed.   Constitutional:       General: She is not in acute distress.     Appearance: She is well-developed.   HENT:      Head: Normocephalic and atraumatic.   Eyes:      Conjunctiva/sclera: Conjunctivae normal.      Pupils: Pupils are equal, round, and reactive to light.   Neck:      Thyroid: No thyromegaly.   Cardiovascular:      Rate and Rhythm: Normal rate and regular rhythm.      Heart sounds: Normal heart sounds.   Pulmonary:       Effort: Pulmonary effort is normal. No respiratory distress.      Breath sounds: Normal breath sounds. No wheezing.   Abdominal:      General: Bowel sounds are normal.      Palpations: Abdomen is soft.      Tenderness: There is no abdominal tenderness.   Musculoskeletal:         General: Normal range of motion.      Cervical back: Normal range of motion and neck supple.   Lymphadenopathy:      Cervical: No cervical adenopathy.   Skin:     General: Skin is warm and dry.      Findings: No rash.   Neurological:      Mental Status: She is alert and oriented to person, place, and time.   Psychiatric:         Behavior: Behavior normal.        Assessment/Plan:       Problem List Items Addressed This Visit    None  Visit Diagnoses       Upper respiratory tract infection, unspecified type    -  Primary    Relevant Medications    methylPREDNISolone acetate injection 40 mg (Completed)    azelastine (ASTELIN) 137 mcg (0.1 %) nasal spray    benzonatate (TESSALON) 100 MG capsule          Symptomatic treatment discussed.    If fever starts okay to start doxy.    RTC if condition acutely worsens or any other concerns, otherwise RTC as scheduled        Answers submitted by the patient for this visit:  Cough Questionnaire (Submitted on 5/2/2023)  Chief Complaint: Cough  Chronicity: recurrent  Onset: in the past 7 days  Progression since onset: gradually worsening  Frequency: every few hours  Cough characteristics: non-productive  ear congestion: No  heartburn: No  hemoptysis: No  nasal congestion: No  sweats: No  weight loss: No  Aggravated by: animals, cold air, dust, lying down, pollens, stress  Risk factors for lung disease: travel  asthma: Yes  bronchiectasis: No  bronchitis: No  COPD: No  emphysema: No  pneumonia: No  Treatments tried: OTC cough suppressant  Improvement on treatment: mild

## 2023-06-27 ENCOUNTER — CLINICAL SUPPORT (OUTPATIENT)
Dept: FAMILY MEDICINE | Facility: CLINIC | Age: 48
End: 2023-06-27
Payer: OTHER GOVERNMENT

## 2023-06-27 ENCOUNTER — APPOINTMENT (OUTPATIENT)
Dept: RADIOLOGY | Facility: CLINIC | Age: 48
End: 2023-06-27
Attending: FAMILY MEDICINE
Payer: OTHER GOVERNMENT

## 2023-06-27 ENCOUNTER — OFFICE VISIT (OUTPATIENT)
Dept: FAMILY MEDICINE | Facility: CLINIC | Age: 48
End: 2023-06-27
Payer: OTHER GOVERNMENT

## 2023-06-27 ENCOUNTER — PATIENT MESSAGE (OUTPATIENT)
Dept: FAMILY MEDICINE | Facility: CLINIC | Age: 48
End: 2023-06-27

## 2023-06-27 VITALS
WEIGHT: 158.63 LBS | RESPIRATION RATE: 20 BRPM | BODY MASS INDEX: 29.19 KG/M2 | HEIGHT: 62 IN | DIASTOLIC BLOOD PRESSURE: 72 MMHG | SYSTOLIC BLOOD PRESSURE: 114 MMHG | HEART RATE: 60 BPM

## 2023-06-27 DIAGNOSIS — Z02.1 PRE-EMPLOYMENT EXAMINATION: ICD-10-CM

## 2023-06-27 DIAGNOSIS — Z02.1 PRE-EMPLOYMENT EXAMINATION: Primary | ICD-10-CM

## 2023-06-27 LAB
ALBUMIN SERPL BCP-MCNC: 3.9 G/DL (ref 3.5–5.2)
ALP SERPL-CCNC: 62 U/L (ref 55–135)
ALT SERPL W/O P-5'-P-CCNC: 129 U/L (ref 10–44)
ANION GAP SERPL CALC-SCNC: 9 MMOL/L (ref 8–16)
AST SERPL-CCNC: 262 U/L (ref 10–40)
BASOPHILS # BLD AUTO: 0.02 K/UL (ref 0–0.2)
BASOPHILS NFR BLD: 0.4 % (ref 0–1.9)
BILIRUB SERPL-MCNC: 0.6 MG/DL (ref 0.1–1)
BILIRUB UR QL STRIP: NEGATIVE
BUN SERPL-MCNC: 12 MG/DL (ref 6–20)
CALCIUM SERPL-MCNC: 9.2 MG/DL (ref 8.7–10.5)
CHLORIDE SERPL-SCNC: 104 MMOL/L (ref 95–110)
CHOLEST SERPL-MCNC: 242 MG/DL (ref 120–199)
CHOLEST/HDLC SERPL: 3.1 {RATIO} (ref 2–5)
CLARITY UR: ABNORMAL
CO2 SERPL-SCNC: 26 MMOL/L (ref 23–29)
COLOR UR: YELLOW
CREAT SERPL-MCNC: 0.8 MG/DL (ref 0.5–1.4)
DIFFERENTIAL METHOD: NORMAL
EOSINOPHIL # BLD AUTO: 0.1 K/UL (ref 0–0.5)
EOSINOPHIL NFR BLD: 2.1 % (ref 0–8)
ERYTHROCYTE [DISTWIDTH] IN BLOOD BY AUTOMATED COUNT: 13 % (ref 11.5–14.5)
EST. GFR  (NO RACE VARIABLE): >60 ML/MIN/1.73 M^2
GLUCOSE SERPL-MCNC: 91 MG/DL (ref 70–110)
GLUCOSE UR QL STRIP: NEGATIVE
HCT VFR BLD AUTO: 41.8 % (ref 37–48.5)
HDLC SERPL-MCNC: 79 MG/DL (ref 40–75)
HDLC SERPL: 32.6 % (ref 20–50)
HGB BLD-MCNC: 13.5 G/DL (ref 12–16)
HGB UR QL STRIP: NEGATIVE
IMM GRANULOCYTES # BLD AUTO: 0.01 K/UL (ref 0–0.04)
IMM GRANULOCYTES NFR BLD AUTO: 0.2 % (ref 0–0.5)
KETONES UR QL STRIP: NEGATIVE
LDLC SERPL CALC-MCNC: 152.2 MG/DL (ref 63–159)
LEUKOCYTE ESTERASE UR QL STRIP: NEGATIVE
LYMPHOCYTES # BLD AUTO: 2.1 K/UL (ref 1–4.8)
LYMPHOCYTES NFR BLD: 40 % (ref 18–48)
MCH RBC QN AUTO: 28.2 PG (ref 27–31)
MCHC RBC AUTO-ENTMCNC: 32.3 G/DL (ref 32–36)
MCV RBC AUTO: 87 FL (ref 82–98)
MICROSCOPIC COMMENT: NORMAL
MONOCYTES # BLD AUTO: 0.3 K/UL (ref 0.3–1)
MONOCYTES NFR BLD: 5.4 % (ref 4–15)
NEUTROPHILS # BLD AUTO: 2.7 K/UL (ref 1.8–7.7)
NEUTROPHILS NFR BLD: 51.9 % (ref 38–73)
NITRITE UR QL STRIP: NEGATIVE
NONHDLC SERPL-MCNC: 163 MG/DL
NRBC BLD-RTO: 0 /100 WBC
PH UR STRIP: 6 [PH] (ref 5–8)
PLATELET # BLD AUTO: 268 K/UL (ref 150–450)
PMV BLD AUTO: 10.8 FL (ref 9.2–12.9)
POTASSIUM SERPL-SCNC: 4.2 MMOL/L (ref 3.5–5.1)
PROT SERPL-MCNC: 7.4 G/DL (ref 6–8.4)
PROT UR QL STRIP: NEGATIVE
RBC # BLD AUTO: 4.78 M/UL (ref 4–5.4)
SODIUM SERPL-SCNC: 139 MMOL/L (ref 136–145)
SP GR UR STRIP: 1.02 (ref 1–1.03)
TRIGL SERPL-MCNC: 54 MG/DL (ref 30–150)
URN SPEC COLLECT METH UR: ABNORMAL
UROBILINOGEN UR STRIP-ACNC: NEGATIVE EU/DL
WBC # BLD AUTO: 5.15 K/UL (ref 3.9–12.7)

## 2023-06-27 PROCEDURE — 85025 COMPLETE CBC W/AUTO DIFF WBC: CPT | Performed by: FAMILY MEDICINE

## 2023-06-27 PROCEDURE — 99999 PR PBB SHADOW E&M-EST. PATIENT-LVL III: CPT | Mod: PBBFAC,,, | Performed by: FAMILY MEDICINE

## 2023-06-27 PROCEDURE — 36415 COLL VENOUS BLD VENIPUNCTURE: CPT | Mod: PBBFAC

## 2023-06-27 PROCEDURE — 71046 X-RAY EXAM CHEST 2 VIEWS: CPT | Mod: 26,,, | Performed by: RADIOLOGY

## 2023-06-27 PROCEDURE — 99213 OFFICE O/P EST LOW 20 MIN: CPT | Mod: PBBFAC,25 | Performed by: FAMILY MEDICINE

## 2023-06-27 PROCEDURE — 99999 PR PBB SHADOW E&M-EST. PATIENT-LVL III: ICD-10-PCS | Mod: PBBFAC,,, | Performed by: FAMILY MEDICINE

## 2023-06-27 PROCEDURE — 99396 PREV VISIT EST AGE 40-64: CPT | Mod: S$PBB,,, | Performed by: FAMILY MEDICINE

## 2023-06-27 PROCEDURE — 80061 LIPID PANEL: CPT | Performed by: FAMILY MEDICINE

## 2023-06-27 PROCEDURE — 81000 URINALYSIS NONAUTO W/SCOPE: CPT | Performed by: FAMILY MEDICINE

## 2023-06-27 PROCEDURE — 71046 X-RAY EXAM CHEST 2 VIEWS: CPT | Mod: TC,PO

## 2023-06-27 PROCEDURE — 80053 COMPREHEN METABOLIC PANEL: CPT | Performed by: FAMILY MEDICINE

## 2023-06-27 PROCEDURE — 99396 PR PREVENTIVE VISIT,EST,40-64: ICD-10-PCS | Mod: S$PBB,,, | Performed by: FAMILY MEDICINE

## 2023-06-27 PROCEDURE — 71046 XR CHEST PA AND LATERAL: ICD-10-PCS | Mod: 26,,, | Performed by: RADIOLOGY

## 2023-06-27 NOTE — PROGRESS NOTES
Subjective:       Patient ID: Debora Vidal is a 48 y.o. female.    Chief Complaint: Employment Physical (Patient here for an annual [employer] physical)    HPI  48 year old female comes in for her PHA exam. She has no complaints today and contiues to run. She is training for a race in [employer]    PMH, PSH, ALLERGIES, SH, FH reviewed in nurse's notes above  Medications reconciled in the nurse's notes      Review of Systems   Constitutional:  Negative for activity change and unexpected weight change.   HENT:  Negative for hearing loss, rhinorrhea and trouble swallowing.    Eyes:  Positive for visual disturbance. Negative for discharge.   Respiratory:  Negative for chest tightness and wheezing.    Cardiovascular:  Negative for chest pain and palpitations.   Gastrointestinal:  Negative for blood in stool, constipation, diarrhea and vomiting.   Endocrine: Negative for polydipsia and polyuria.   Genitourinary:  Negative for difficulty urinating, dysuria, hematuria and menstrual problem.   Musculoskeletal:  Negative for arthralgias, joint swelling and neck pain.   Neurological:  Negative for weakness and headaches.   Psychiatric/Behavioral:  Negative for confusion and dysphoric mood.      Objective:      Physical Exam  Vitals and nursing note reviewed.   Constitutional:       General: She is not in acute distress.     Appearance: She is well-developed.   HENT:      Head: Normocephalic and atraumatic.   Eyes:      Conjunctiva/sclera: Conjunctivae normal.      Pupils: Pupils are equal, round, and reactive to light.   Neck:      Thyroid: No thyromegaly.   Cardiovascular:      Rate and Rhythm: Normal rate and regular rhythm.      Heart sounds: Normal heart sounds.   Pulmonary:      Effort: Pulmonary effort is normal. No respiratory distress.      Breath sounds: Normal breath sounds. No wheezing.   Abdominal:      General: Bowel sounds are normal.      Palpations: Abdomen is soft.      Tenderness: There is no abdominal  tenderness.   Musculoskeletal:         General: Normal range of motion.      Cervical back: Normal range of motion and neck supple.   Lymphadenopathy:      Cervical: No cervical adenopathy.   Skin:     General: Skin is warm and dry.      Findings: No rash.   Neurological:      Mental Status: She is alert and oriented to person, place, and time.   Psychiatric:         Behavior: Behavior normal.        Assessment/Plan:       Problem List Items Addressed This Visit    None  Visit Diagnoses       Pre-employment examination    -  Primary    Relevant Orders    CBC Auto Differential    Comprehensive Metabolic Panel    Lipid Panel    Urinalysis    Complete PFT without bronchodilator    X-Ray Chest PA And Lateral (Completed)        Health maint reviewed.    RTC if condition acutely worsens or any other concerns, otherwise RTC as scheduled

## 2023-06-29 DIAGNOSIS — R79.89 ELEVATED LFTS: Primary | ICD-10-CM

## 2023-07-06 ENCOUNTER — TELEPHONE (OUTPATIENT)
Dept: FAMILY MEDICINE | Facility: CLINIC | Age: 48
End: 2023-07-06
Payer: OTHER GOVERNMENT

## 2023-07-06 ENCOUNTER — HOSPITAL ENCOUNTER (OUTPATIENT)
Dept: PULMONOLOGY | Facility: HOSPITAL | Age: 48
Discharge: HOME | End: 2023-07-06
Attending: FAMILY MEDICINE
Payer: OTHER GOVERNMENT

## 2023-07-06 DIAGNOSIS — Z02.1 PRE-EMPLOYMENT EXAMINATION: Primary | ICD-10-CM

## 2023-07-06 PROCEDURE — 99900035 HC TECH TIME PER 15 MIN (STAT)

## 2023-07-06 PROCEDURE — 94727 GAS DIL/WSHOT DETER LNG VOL: CPT

## 2023-07-06 PROCEDURE — 94060 EVALUATION OF WHEEZING: CPT

## 2023-07-06 PROCEDURE — 99900031 HC PATIENT EDUCATION (STAT)

## 2023-07-06 PROCEDURE — 94729 DIFFUSING CAPACITY: CPT

## 2023-07-11 PROBLEM — Z02.1 PRE-EMPLOYMENT EXAMINATION: Status: ACTIVE | Noted: 2023-07-11

## 2023-07-11 LAB
BRPFT: ABNORMAL
DLCO SINGLE BREATH LLN: 17.38
DLCO SINGLE BREATH PRE REF: 79.3 %
DLCO SINGLE BREATH REF: 23.11
DLCOC SBVA LLN: 3.37
DLCOC SBVA REF: 5.02
DLCOC SINGLE BREATH LLN: 17.38
DLCOC SINGLE BREATH REF: 23.11
DLCOVA LLN: 3.37
DLCOVA PRE REF: 107.3 %
DLCOVA PRE: 5.39 ML/(MIN*MMHG*L) (ref 3.37–6.67)
DLCOVA REF: 5.02
ERVN2 LLN: -16449.06
ERVN2 PRE REF: 48.4 %
ERVN2 PRE: 0.46 L (ref -16449.06–16450.94)
ERVN2 REF: 0.94
FEF 25 75 CHG: 5.4 %
FEF 25 75 LLN: 1.82
FEF 25 75 POST REF: 94 %
FEF 25 75 PRE REF: 89.2 %
FEF 25 75 REF: 3.22
FET100 CHG: 1.9 %
FEV1 CHG: 0.5 %
FEV1 FVC CHG: 1.7 %
FEV1 FVC LLN: 74
FEV1 FVC POST REF: 100.1 %
FEV1 FVC PRE REF: 98.4 %
FEV1 FVC REF: 83
FEV1 LLN: 1.77
FEV1 POST REF: 105.7 %
FEV1 PRE REF: 105.2 %
FEV1 REF: 2.31
FRCN2 LLN: 1.75
FRCN2 PRE REF: 51.4 %
FRCN2 REF: 2.58
FVC CHG: -1.2 %
FVC LLN: 2.15
FVC POST REF: 105.4 %
FVC PRE REF: 106.7 %
FVC REF: 2.78
IVC PRE: 2.54 L (ref 2.15–3.43)
IVC SINGLE BREATH LLN: 2.15
IVC SINGLE BREATH PRE REF: 91.5 %
IVC SINGLE BREATH REF: 2.78
MEP LLN: 63
MEP PRE REF: 123.3 %
MEP PRE: 98.63 CMH2O (ref 63.23–96.78)
MEP REF: 80
MIP LLN: 33
MIP PRE REF: 68.1 %
MIP PRE: 34.03 CMH2O (ref 33.23–66.78)
MIP REF: 50
MVV LLN: 81
MVV PRE REF: 74.1 %
MVV REF: 96
PEF CHG: 2.2 %
PEF LLN: 4.6
PEF POST REF: 102 %
PEF PRE REF: 99.9 %
PEF REF: 6.08
POST FEF 25 75: 3.03 L/S (ref 1.82–4.62)
POST FET 100: 6.93 SEC
POST FEV1 FVC: 83.33 % (ref 73.59–91.63)
POST FEV1: 2.44 L (ref 1.77–2.83)
POST FVC: 2.93 L (ref 2.15–3.43)
POST PEF: 6.21 L/S (ref 4.6–7.56)
PRE DLCO: 18.32 ML/(MIN*MMHG) (ref 17.38–28.85)
PRE FEF 25 75: 2.87 L/S (ref 1.82–4.62)
PRE FET 100: 6.8 SEC
PRE FEV1 FVC: 81.96 % (ref 73.59–91.63)
PRE FEV1: 2.43 L (ref 1.77–2.83)
PRE FRC N2: 1.33 L (ref 1.75–3.4)
PRE FVC: 2.97 L (ref 2.15–3.43)
PRE MVV: 70.99 L/MIN (ref 81.45–110.19)
PRE PEF: 6.07 L/S (ref 4.6–7.56)
RVN2 LLN: 1.06
RVN2 PRE REF: 53.2 %
RVN2 PRE: 0.87 L (ref 1.06–2.21)
RVN2 REF: 1.63
RVN2TLCN2 LLN: 26.03
RVN2TLCN2 PRE REF: 63.6 %
RVN2TLCN2 PRE: 22.66 % (ref 26.03–45.21)
RVN2TLCN2 REF: 35.62
TLCN2 LLN: 3.62
TLCN2 PRE REF: 83.3 %
TLCN2 PRE: 3.83 L (ref 3.62–5.59)
TLCN2 REF: 4.6
VA PRE: 3.4 L (ref 4.45–4.45)
VA SINGLE BREATH LLN: 4.45
VA SINGLE BREATH PRE REF: 76.4 %
VA SINGLE BREATH REF: 4.45
VCMAXN2 LLN: 2.15
VCMAXN2 PRE REF: 106.7 %
VCMAXN2 PRE: 2.97 L (ref 2.15–3.43)
VCMAXN2 REF: 2.78

## 2023-07-11 PROCEDURE — 94727 PR PULM FUNCTION TEST BY GAS: ICD-10-PCS | Mod: 26,,, | Performed by: INTERNAL MEDICINE

## 2023-07-11 PROCEDURE — 94060 PR EVAL OF BRONCHOSPASM: ICD-10-PCS | Mod: 26,,, | Performed by: INTERNAL MEDICINE

## 2023-07-11 PROCEDURE — 94799 PR NIF/PIF PULMONARY FUNCTION TEST: ICD-10-PCS | Mod: 26,,, | Performed by: INTERNAL MEDICINE

## 2023-07-11 PROCEDURE — 94799 UNLISTED PULMONARY SVC/PX: CPT | Mod: 26,,, | Performed by: INTERNAL MEDICINE

## 2023-07-11 PROCEDURE — 94729 DIFFUSING CAPACITY: CPT | Mod: 26,,, | Performed by: INTERNAL MEDICINE

## 2023-07-11 PROCEDURE — 94729 PR C02/MEMBANE DIFFUSE CAPACITY: ICD-10-PCS | Mod: 26,,, | Performed by: INTERNAL MEDICINE

## 2023-07-11 PROCEDURE — 94060 EVALUATION OF WHEEZING: CPT | Mod: 26,,, | Performed by: INTERNAL MEDICINE

## 2023-07-11 PROCEDURE — 94727 GAS DIL/WSHOT DETER LNG VOL: CPT | Mod: 26,,, | Performed by: INTERNAL MEDICINE

## 2023-07-24 ENCOUNTER — PATIENT MESSAGE (OUTPATIENT)
Dept: FAMILY MEDICINE | Facility: CLINIC | Age: 48
End: 2023-07-24
Payer: OTHER GOVERNMENT

## 2023-07-24 DIAGNOSIS — Z01.00 ROUTINE EYE EXAM: Primary | ICD-10-CM

## 2023-07-28 ENCOUNTER — PATIENT MESSAGE (OUTPATIENT)
Dept: FAMILY MEDICINE | Facility: CLINIC | Age: 48
End: 2023-07-28
Payer: OTHER GOVERNMENT

## 2023-08-16 ENCOUNTER — HOSPITAL ENCOUNTER (OUTPATIENT)
Dept: RADIOLOGY | Facility: HOSPITAL | Age: 48
Discharge: HOME | End: 2023-08-16
Attending: FAMILY MEDICINE
Payer: OTHER GOVERNMENT

## 2023-08-16 VITALS — WEIGHT: 158 LBS | BODY MASS INDEX: 29.08 KG/M2 | HEIGHT: 62 IN

## 2023-08-16 DIAGNOSIS — Z12.31 ENCOUNTER FOR SCREENING MAMMOGRAM FOR BREAST CANCER: ICD-10-CM

## 2023-08-16 PROCEDURE — 77063 MAMMO DIGITAL SCREENING BILAT WITH TOMO: ICD-10-PCS | Mod: 26,,, | Performed by: RADIOLOGY

## 2023-08-16 PROCEDURE — 77067 SCR MAMMO BI INCL CAD: CPT | Mod: TC

## 2023-08-16 PROCEDURE — 77067 SCR MAMMO BI INCL CAD: CPT | Mod: 26,,, | Performed by: RADIOLOGY

## 2023-08-16 PROCEDURE — 77063 BREAST TOMOSYNTHESIS BI: CPT | Mod: 26,,, | Performed by: RADIOLOGY

## 2023-08-16 PROCEDURE — 77067 MAMMO DIGITAL SCREENING BILAT WITH TOMO: ICD-10-PCS | Mod: 26,,, | Performed by: RADIOLOGY

## 2023-08-18 DIAGNOSIS — R92.8 ABNORMAL MAMMOGRAM: Primary | ICD-10-CM

## 2023-08-21 NOTE — PROGRESS NOTES
Called pt and scheduled her us and diagnostic mammo of L breast with radiology for 9/13/23 per pt request. Is out of town now.

## 2023-08-30 ENCOUNTER — PATIENT OUTREACH (OUTPATIENT)
Dept: ADMINISTRATIVE | Facility: HOSPITAL | Age: 48
End: 2023-08-30
Payer: OTHER GOVERNMENT

## 2023-09-13 ENCOUNTER — HOSPITAL ENCOUNTER (OUTPATIENT)
Dept: RADIOLOGY | Facility: HOSPITAL | Age: 48
Discharge: HOME | End: 2023-09-13
Attending: FAMILY MEDICINE
Payer: OTHER GOVERNMENT

## 2023-09-13 DIAGNOSIS — R92.8 ABNORMAL MAMMOGRAM: ICD-10-CM

## 2023-09-13 PROCEDURE — 77065 DX MAMMO INCL CAD UNI: CPT | Mod: 26,LT,, | Performed by: RADIOLOGY

## 2023-09-13 PROCEDURE — 77061 MAMMO DIGITAL DIAGNOSTIC LEFT WITH TOMO: ICD-10-PCS | Mod: 26,LT,, | Performed by: RADIOLOGY

## 2023-09-13 PROCEDURE — 77065 MAMMO DIGITAL DIAGNOSTIC LEFT WITH TOMO: ICD-10-PCS | Mod: 26,LT,, | Performed by: RADIOLOGY

## 2023-09-13 PROCEDURE — 77061 BREAST TOMOSYNTHESIS UNI: CPT | Mod: TC,LT

## 2023-09-13 PROCEDURE — 77061 BREAST TOMOSYNTHESIS UNI: CPT | Mod: 26,LT,, | Performed by: RADIOLOGY

## 2023-10-16 PROBLEM — Z02.1 PRE-EMPLOYMENT EXAMINATION: Status: RESOLVED | Noted: 2023-07-11 | Resolved: 2023-10-16

## 2024-02-08 ENCOUNTER — OFFICE VISIT (OUTPATIENT)
Dept: FAMILY MEDICINE | Facility: CLINIC | Age: 49
End: 2024-02-08
Payer: OTHER GOVERNMENT

## 2024-02-08 VITALS
HEIGHT: 62 IN | WEIGHT: 168.75 LBS | DIASTOLIC BLOOD PRESSURE: 78 MMHG | RESPIRATION RATE: 16 BRPM | HEART RATE: 52 BPM | BODY MASS INDEX: 31.05 KG/M2 | SYSTOLIC BLOOD PRESSURE: 124 MMHG

## 2024-02-08 DIAGNOSIS — M17.0 BILATERAL PRIMARY OSTEOARTHRITIS OF KNEE: Primary | ICD-10-CM

## 2024-02-08 PROCEDURE — 99213 OFFICE O/P EST LOW 20 MIN: CPT | Mod: S$PBB,,, | Performed by: FAMILY MEDICINE

## 2024-02-08 PROCEDURE — 99999 PR PBB SHADOW E&M-EST. PATIENT-LVL III: CPT | Mod: PBBFAC,,, | Performed by: FAMILY MEDICINE

## 2024-02-08 PROCEDURE — 99213 OFFICE O/P EST LOW 20 MIN: CPT | Mod: PBBFAC | Performed by: FAMILY MEDICINE

## 2024-02-08 NOTE — PROGRESS NOTES
Subjective:       Patient ID: Debora Vidal is a 48 y.o. female.    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 She's ayst hat she has increased pain in her left knee as well. She was registered to be part of a study on the [facility], but her MRI showed too much cartilagenous damage. In the past she received hyalgen injections. Currently she runs for exercise. Recently had PT s/p bunion surgery.    PMH, PSH, ALLERGIES, SH, FH reviewed in nurse's notes above  Medications reconciled in the nurse's notes      Review of Systems   Constitutional:  Negative for chills and fever.   HENT:  Negative for congestion, ear pain, postnasal drip, rhinorrhea, sore throat and trouble swallowing.    Eyes:  Negative for redness and itching.   Respiratory:  Negative for cough, shortness of breath and wheezing.    Cardiovascular:  Negative for chest pain and palpitations.   Gastrointestinal:  Negative for abdominal pain, diarrhea, nausea and vomiting.   Genitourinary:  Negative for dysuria and frequency.   Musculoskeletal:  Positive for arthralgias.   Skin:  Negative for rash.   Neurological:  Negative for weakness and headaches.       Objective:      Physical Exam  Vitals and nursing note reviewed.   Constitutional:       General: She is not in acute distress.     Appearance: She is well-developed.   HENT:      Head: Normocephalic and atraumatic.   Eyes:      Conjunctiva/sclera: Conjunctivae normal.      Pupils: Pupils are equal, round, and reactive to light.   Neck:      Thyroid: No thyromegaly.   Cardiovascular:      Rate and Rhythm: Normal rate and regular rhythm.      Heart sounds: Normal heart sounds.   Pulmonary:      Effort: Pulmonary effort is normal. No respiratory distress.      Breath sounds: Normal breath sounds. No wheezing.   Abdominal:      General: Bowel sounds are normal.      Palpations: Abdomen is  soft.      Tenderness: There is no abdominal tenderness.   Musculoskeletal:         General: Normal range of motion.      Cervical back: Normal range of motion and neck supple.   Lymphadenopathy:      Cervical: No cervical adenopathy.   Skin:     General: Skin is warm and dry.      Findings: No rash.   Neurological:      Mental Status: She is alert and oriented to person, place, and time.   Psychiatric:         Behavior: Behavior normal.          Assessment/Plan:       Problem List Items Addressed This Visit    None  Visit Diagnoses       Bilateral primary osteoarthritis of knee    -  Primary    Relevant Orders    Ambulatory referral/consult to Orthopedics    Ambulatory referral/consult to Physical/Occupational Therapy          RTC if condition acutely worsens or any other concerns, otherwise RTC as scheduled    Discussed repeating pt but specifically for sports med and running training.   Also advised AGAINST running as she is having OA already.    Will send to ortho for synvisc after MRI review - patient to obtain imaging from study and forward for our review

## 2024-02-22 ENCOUNTER — TELEPHONE (OUTPATIENT)
Dept: ORTHOPEDICS | Facility: CLINIC | Age: 49
End: 2024-02-22
Payer: OTHER GOVERNMENT

## 2024-02-22 ENCOUNTER — TELEPHONE (OUTPATIENT)
Dept: FAMILY MEDICINE | Facility: CLINIC | Age: 49
End: 2024-02-22
Payer: OTHER GOVERNMENT

## 2024-02-22 NOTE — TELEPHONE ENCOUNTER
"Records received from [facility].   MRI showed "tear posterior horn/body medial meniscus.   Dr. Pelaez recommends ortho and PT. Referrals placed at [facility]   Re faxed referral to [facility] with records received.     "

## 2024-02-22 NOTE — TELEPHONE ENCOUNTER
Attempted to call patient in regards to a referral we received. Patient did not answer left voicemail to call clinic if she would like to schedule an appointment.

## 2024-04-03 ENCOUNTER — TELEPHONE (OUTPATIENT)
Dept: ORTHOPEDICS | Facility: CLINIC | Age: 49
End: 2024-04-03
Payer: OTHER GOVERNMENT

## 2024-04-03 NOTE — TELEPHONE ENCOUNTER
Attempted to call patient to find out what she was coming in for. Patient did not answer, left voicemail to call the clinic at [phone].

## 2024-04-03 NOTE — TELEPHONE ENCOUNTER
Attempted to call patient to verify what she was coming in for. Patient did not answer, Left voicemail to call the clinic at [phone].

## 2024-04-04 ENCOUNTER — HOSPITAL ENCOUNTER (OUTPATIENT)
Dept: RADIOLOGY | Facility: HOSPITAL | Age: 49
Discharge: HOME | End: 2024-04-04
Attending: PHYSICIAN ASSISTANT
Payer: OTHER GOVERNMENT

## 2024-04-04 ENCOUNTER — OFFICE VISIT (OUTPATIENT)
Dept: ORTHOPEDICS | Facility: CLINIC | Age: 49
End: 2024-04-04
Payer: OTHER GOVERNMENT

## 2024-04-04 ENCOUNTER — TELEPHONE (OUTPATIENT)
Dept: ORTHOPEDICS | Facility: CLINIC | Age: 49
End: 2024-04-04
Payer: OTHER GOVERNMENT

## 2024-04-04 VITALS
DIASTOLIC BLOOD PRESSURE: 76 MMHG | BODY MASS INDEX: 31.35 KG/M2 | OXYGEN SATURATION: 100 % | HEART RATE: 60 BPM | HEIGHT: 62 IN | SYSTOLIC BLOOD PRESSURE: 122 MMHG | WEIGHT: 170.38 LBS | RESPIRATION RATE: 16 BRPM

## 2024-04-04 DIAGNOSIS — M25.562 PAIN IN BOTH KNEES, UNSPECIFIED CHRONICITY: Primary | ICD-10-CM

## 2024-04-04 DIAGNOSIS — M25.561 PAIN IN BOTH KNEES, UNSPECIFIED CHRONICITY: Primary | ICD-10-CM

## 2024-04-04 DIAGNOSIS — M25.561 PAIN IN BOTH KNEES, UNSPECIFIED CHRONICITY: ICD-10-CM

## 2024-04-04 DIAGNOSIS — M25.562 PAIN IN BOTH KNEES, UNSPECIFIED CHRONICITY: ICD-10-CM

## 2024-04-04 DIAGNOSIS — M17.0 BILATERAL PRIMARY OSTEOARTHRITIS OF KNEE: Primary | ICD-10-CM

## 2024-04-04 PROCEDURE — 73564 X-RAY EXAM KNEE 4 OR MORE: CPT | Mod: 26,50,, | Performed by: STUDENT IN AN ORGANIZED HEALTH CARE EDUCATION/TRAINING PROGRAM

## 2024-04-04 PROCEDURE — 99203 OFFICE O/P NEW LOW 30 MIN: CPT | Mod: S$PBB,25,, | Performed by: PHYSICIAN ASSISTANT

## 2024-04-04 PROCEDURE — 73564 X-RAY EXAM KNEE 4 OR MORE: CPT | Mod: TC,50

## 2024-04-04 PROCEDURE — 20610 DRAIN/INJ JOINT/BURSA W/O US: CPT | Mod: 50,S$PBB,, | Performed by: PHYSICIAN ASSISTANT

## 2024-04-04 PROCEDURE — 99999PBSHW PR PBB SHADOW TECHNICAL ONLY FILED TO HB: Mod: PBBFAC,,,

## 2024-04-04 PROCEDURE — 99213 OFFICE O/P EST LOW 20 MIN: CPT | Mod: PBBFAC,25 | Performed by: PHYSICIAN ASSISTANT

## 2024-04-04 PROCEDURE — 99999 PR PBB SHADOW E&M-EST. PATIENT-LVL III: CPT | Mod: PBBFAC,,, | Performed by: PHYSICIAN ASSISTANT

## 2024-04-04 PROCEDURE — 20610 DRAIN/INJ JOINT/BURSA W/O US: CPT | Mod: 50,PBBFAC | Performed by: PHYSICIAN ASSISTANT

## 2024-04-04 RX ORDER — TRIAMCINOLONE ACETONIDE 40 MG/ML
40 INJECTION, SUSPENSION INTRA-ARTICULAR; INTRAMUSCULAR ONCE
Status: COMPLETED | OUTPATIENT
Start: 2024-04-04 | End: 2024-04-04

## 2024-04-04 RX ADMIN — TRIAMCINOLONE ACETONIDE 40 MG: 40 INJECTION, SUSPENSION INTRA-ARTICULAR; INTRAMUSCULAR at 12:04

## 2024-04-04 NOTE — PROGRESS NOTES
Subjective:      Patient ID: Debora Vidal is a 48 y.o. female.    Chief Complaint: Pain of the Right Knee and Pain of the Left Knee    Review of patient's allergies indicates:   Allergen Reactions    Grass pollen-june grass standard Hives, Shortness Of Breath and Swelling    Insect venom Swelling     ANTS    Cat/feline products Hives      47 yo F presents to clinic with c/o bilateral knee pain x years.  No recent injury or trauma but does have history of right knee scope about 10 years ago.  Achy pain mostly to medial knees.  Worse with running and improves some with rest.  No swelling, redness, warmth.  No catching, locking, giving way.  She has had injections in the past, both CSI and visco, with good relief of pain.  Last injections were over 2 years ago.  She recently started PT at [facility].        Review of Systems   Constitutional: Negative for chills, diaphoresis and fever.   HENT:  Negative for congestion, ear discharge and ear pain.    Eyes:  Negative for blurred vision, discharge, double vision and pain.   Cardiovascular:  Negative for chest pain, claudication and cyanosis.   Respiratory:  Negative for cough, hemoptysis and shortness of breath.    Endocrine: Negative for cold intolerance and heat intolerance.   Skin:  Negative for color change, dry skin, itching and rash.   Musculoskeletal:  Positive for arthritis and joint pain. Negative for back pain, falls, gout, joint swelling, muscle weakness and neck pain.   Gastrointestinal:  Negative for abdominal pain and change in bowel habit.   Neurological:  Negative for brief paralysis, disturbances in coordination, dizziness, numbness and paresthesias.   Psychiatric/Behavioral:  Negative for altered mental status and depression.          Objective:          General    Constitutional: She is oriented to person, place, and time. She appears well-developed and well-nourished. No distress.   HENT:   Head: Atraumatic.   Eyes: EOM are normal. Right eye exhibits no  discharge. Left eye exhibits no discharge.   Cardiovascular:  Normal rate.            Pulmonary/Chest: Effort normal. No respiratory distress.   Abdominal: Soft.   Neurological: She is alert and oriented to person, place, and time.   Psychiatric: She has a normal mood and affect. Her behavior is normal.           Right Knee Exam     Inspection   Erythema: absent  Scars: present  Swelling: absent  Effusion: absent  Deformity: absent  Bruising: absent    Crepitus   The patient has crepitus of the patella and medial joint line.    Range of Motion   The patient has normal right knee ROM.    Tests   Ligament Examination   MCL - Valgus: normal (0 to 2mm)  LCL - Varus: normal    Other   Sensation: normal    Left Knee Exam     Inspection   Erythema: absent  Scars: absent  Swelling: absent  Effusion: absent  Deformity: absent  Bruising: absent    Crepitus   The patient has crepitus of the patella and medial joint line.    Range of Motion   The patient has normal left knee ROM.    Tests   Stability   MCL - Valgus: normal (0 to 2mm)  LCL - Varus: normal (0 to 2mm)    Other   Sensation: normal    Vascular Exam       Edema  Right Lower Leg: absent  Left Lower Leg: absent              Assessment:         Xray Bilateral Knee 4/4/24:  Mild joint space narrowing of the bilateral tibiofemoral compartments. Mild bilateral osteophyte production. No acute fracture, dislocation, or osseous destruction. No joint effusion. Soft tissues are unremarkable.       Encounter Diagnosis   Name Primary?    Bilateral primary osteoarthritis of knee Yes    Bilateral primary osteoarthritis of knee  -     Ambulatory referral/consult to Orthopedics  -     triamcinolone acetonide injection 40 mg  -     triamcinolone acetonide injection 40 mg               Plan:         The total face-to-face encounter time with this patient was 30 minutes and greater than 50% of of the encounter time was spent counseling the patient, coordinating care, and education  regarding the pathology and natural history of her diagnosis. We have discussed a variety of treatment options including medications, injections, physical therapy and other alternative treatments. Pt is requesting CSI and physical therapy at this time.  Today, the patient chooses  a CSI and understands a minimum of 3 months time must lapse after injection, prior to a surgical procedure due to increased risk of infection.     I made the decision to obtain old records of the patient including previous notes and imaging. New imaging was ordered today of the extremity or extremities evaluated. I independently reviewed and interpreted the radiographs and/or MRIs today as well as prior imaging.      1. Injection Procedure  A time out was performed, including verification of patient ID, procedure, site and side, availability of information and equipment, review of safety issues, and agreement with consent, the procedure site was marked.    After time out was performed, the patient was prepped aseptically with chloraprep swabsticks. A diagnostic and therapeutic injection of 1:3cc Kenalog/Marcaine was given under sterile technique using a 22g x 1.5 needle from the Anterolateral aspect of the bilateral Knee Joint in the sitting position.      Debora Vidal had no adverse reactions to the medication. Pain decreased. She was instructed to apply ice to the joint for 20 minutes and avoid strenuous activities for 24-36 hours following the injection. She was warned of possible blood sugar and/or blood pressure changes during that time. Following that time, she can resume regular activities.    She was reminded to call the clinic immediately for any adverse side effects as explained in clinic today.    2. Continue PT as scheduled.  3. Ice compress to the affected area 2-3x a day for 15-20 minutes as needed for pain management.  4. RTC in 6 weeks for follow-up, sooner if needed.    Patient voices understanding of and agreement with  treatment plan. All of the patient's questions were answered and the patient will contact us if she has any questions or concerns in the interim.

## 2024-04-07 ENCOUNTER — PATIENT MESSAGE (OUTPATIENT)
Dept: ADMINISTRATIVE | Facility: HOSPITAL | Age: 49
End: 2024-04-07
Payer: OTHER GOVERNMENT

## 2024-10-09 DIAGNOSIS — Z12.31 OTHER SCREENING MAMMOGRAM: ICD-10-CM
